# Patient Record
Sex: MALE | NOT HISPANIC OR LATINO | URBAN - METROPOLITAN AREA
[De-identification: names, ages, dates, MRNs, and addresses within clinical notes are randomized per-mention and may not be internally consistent; named-entity substitution may affect disease eponyms.]

---

## 2019-01-03 ENCOUNTER — OFFICE (OUTPATIENT)
Dept: URBAN - METROPOLITAN AREA CLINIC 33 | Facility: CLINIC | Age: 29
End: 2019-01-03
Payer: MEDICAID

## 2019-01-03 VITALS
HEIGHT: 64 IN | DIASTOLIC BLOOD PRESSURE: 62 MMHG | WEIGHT: 155 LBS | HEART RATE: 72 BPM | SYSTOLIC BLOOD PRESSURE: 128 MMHG | TEMPERATURE: 97.7 F

## 2019-01-03 DIAGNOSIS — R10.84 GENERALIZED ABDOMINAL PAIN: ICD-10-CM

## 2019-01-03 DIAGNOSIS — K50.90 CROHN'S DISEASE, UNSPECIFIED, WITHOUT COMPLICATIONS: ICD-10-CM

## 2019-01-03 DIAGNOSIS — R10.13 EPIGASTRIC PAIN: ICD-10-CM

## 2019-01-03 PROCEDURE — 99204 OFFICE O/P NEW MOD 45 MIN: CPT

## 2019-01-07 LAB
ADALIMUMAB+AB (SERIAL MONITOR): ADALIMUMAB DRUG LEVEL: 10 UG/ML
ADALIMUMAB+AB (SERIAL MONITOR): ANTI-ADALIMUMAB ANTIBODY: <25 NG/ML
AMBIG ABBREV BMP8 DEFAULT: (no result)
AMBIG ABBREV HFP7 DEFAULT: (no result)
BASIC METABOLIC PANEL (8): BUN/CREATININE RATIO: 10 (ref 9–20)
BASIC METABOLIC PANEL (8): BUN: 10 MG/DL (ref 6–20)
BASIC METABOLIC PANEL (8): CALCIUM: 9.5 MG/DL (ref 8.7–10.2)
BASIC METABOLIC PANEL (8): CARBON DIOXIDE, TOTAL: 22 MMOL/L (ref 20–29)
BASIC METABOLIC PANEL (8): CHLORIDE: 102 MMOL/L (ref 96–106)
BASIC METABOLIC PANEL (8): CREATININE: 1.03 MG/DL (ref 0.76–1.27)
BASIC METABOLIC PANEL (8): EGFR IF AFRICN AM: 114 ML/MIN/1.73 (ref 59–?)
BASIC METABOLIC PANEL (8): EGFR IF NONAFRICN AM: 98 ML/MIN/1.73 (ref 59–?)
BASIC METABOLIC PANEL (8): GLUCOSE: 96 MG/DL (ref 65–99)
BASIC METABOLIC PANEL (8): POTASSIUM: 4.1 MMOL/L (ref 3.5–5.2)
BASIC METABOLIC PANEL (8): SODIUM: 141 MMOL/L (ref 134–144)
CBC/DIFF AMBIGUOUS DEFAULT: BASO (ABSOLUTE): 0 X10E3/UL (ref 0–0.2)
CBC/DIFF AMBIGUOUS DEFAULT: BASOS: 0 %
CBC/DIFF AMBIGUOUS DEFAULT: EOS (ABSOLUTE): 0.1 X10E3/UL (ref 0–0.4)
CBC/DIFF AMBIGUOUS DEFAULT: EOS: 2 %
CBC/DIFF AMBIGUOUS DEFAULT: HEMATOCRIT: 45.5 % (ref 37.5–51)
CBC/DIFF AMBIGUOUS DEFAULT: HEMOGLOBIN: 15.6 G/DL (ref 13–17.7)
CBC/DIFF AMBIGUOUS DEFAULT: IMMATURE GRANS (ABS): 0 X10E3/UL (ref 0–0.1)
CBC/DIFF AMBIGUOUS DEFAULT: IMMATURE GRANULOCYTES: 0 %
CBC/DIFF AMBIGUOUS DEFAULT: LYMPHS (ABSOLUTE): 1.8 X10E3/UL (ref 0.7–3.1)
CBC/DIFF AMBIGUOUS DEFAULT: LYMPHS: 30 %
CBC/DIFF AMBIGUOUS DEFAULT: MCH: 30.6 PG (ref 26.6–33)
CBC/DIFF AMBIGUOUS DEFAULT: MCHC: 34.3 G/DL (ref 31.5–35.7)
CBC/DIFF AMBIGUOUS DEFAULT: MCV: 89 FL (ref 79–97)
CBC/DIFF AMBIGUOUS DEFAULT: MONOCYTES(ABSOLUTE): 0.5 X10E3/UL (ref 0.1–0.9)
CBC/DIFF AMBIGUOUS DEFAULT: MONOCYTES: 8 %
CBC/DIFF AMBIGUOUS DEFAULT: NEUTROPHILS (ABSOLUTE): 3.6 X10E3/UL (ref 1.4–7)
CBC/DIFF AMBIGUOUS DEFAULT: NEUTROPHILS: 60 %
CBC/DIFF AMBIGUOUS DEFAULT: PLATELETS: 346 X10E3/UL (ref 150–379)
CBC/DIFF AMBIGUOUS DEFAULT: RBC: 5.1 X10E6/UL (ref 4.14–5.8)
CBC/DIFF AMBIGUOUS DEFAULT: RDW: 13.7 % (ref 12.3–15.4)
CBC/DIFF AMBIGUOUS DEFAULT: WBC: 6.1 X10E3/UL (ref 3.4–10.8)
HEPATIC FUNCTION PANEL (7): ALBUMIN: 4.5 G/DL (ref 3.5–5.5)
HEPATIC FUNCTION PANEL (7): ALKALINE PHOSPHATASE: 37 IU/L — LOW (ref 39–117)
HEPATIC FUNCTION PANEL (7): ALT (SGPT): 26 IU/L (ref 0–44)
HEPATIC FUNCTION PANEL (7): AST (SGOT): 38 IU/L (ref 0–40)
HEPATIC FUNCTION PANEL (7): BILIRUBIN, DIRECT: 0.15 MG/DL (ref 0–0.4)
HEPATIC FUNCTION PANEL (7): BILIRUBIN, TOTAL: 0.6 MG/DL (ref 0–1.2)
HEPATIC FUNCTION PANEL (7): PROTEIN, TOTAL: 7.2 G/DL (ref 6–8.5)

## 2019-01-08 ENCOUNTER — AMBULATORY SURGICAL CENTER (OUTPATIENT)
Dept: URBAN - METROPOLITAN AREA SURGERY 21 | Facility: SURGERY | Age: 29
End: 2019-01-08
Payer: MEDICAID

## 2019-01-08 DIAGNOSIS — K29.60 OTHER GASTRITIS WITHOUT BLEEDING: ICD-10-CM

## 2019-01-08 DIAGNOSIS — R10.84 GENERALIZED ABDOMINAL PAIN: ICD-10-CM

## 2019-01-08 DIAGNOSIS — K50.90 CROHN'S DISEASE, UNSPECIFIED, WITHOUT COMPLICATIONS: ICD-10-CM

## 2019-01-08 PROCEDURE — 43239 EGD BIOPSY SINGLE/MULTIPLE: CPT

## 2019-01-15 ENCOUNTER — AMBULATORY SURGICAL CENTER (OUTPATIENT)
Dept: URBAN - METROPOLITAN AREA SURGERY 21 | Facility: SURGERY | Age: 29
End: 2019-01-15
Payer: MEDICAID

## 2019-01-15 DIAGNOSIS — K50.90 CROHN'S DISEASE, UNSPECIFIED, WITHOUT COMPLICATIONS: ICD-10-CM

## 2019-01-15 PROCEDURE — 45380 COLONOSCOPY AND BIOPSY: CPT

## 2019-03-14 ENCOUNTER — OFFICE (OUTPATIENT)
Dept: URBAN - METROPOLITAN AREA CLINIC 33 | Facility: CLINIC | Age: 29
End: 2019-03-14
Payer: MEDICAID

## 2019-03-14 VITALS
TEMPERATURE: 97.8 F | WEIGHT: 152 LBS | DIASTOLIC BLOOD PRESSURE: 70 MMHG | SYSTOLIC BLOOD PRESSURE: 103 MMHG | HEART RATE: 56 BPM | HEIGHT: 64 IN

## 2019-03-14 DIAGNOSIS — K50.80 CROHN'S DISEASE OF BOTH SMALL AND LARGE INTESTINE WITHOUT CO: ICD-10-CM

## 2019-03-14 PROCEDURE — 99214 OFFICE O/P EST MOD 30 MIN: CPT

## 2019-03-26 LAB
HBSAG SCREEN: NEGATIVE
HEP B CORE AB, IGM: NEGATIVE
HEP B SURFACE AB: HEP B SURFACE AB, QUAL: NON REACTIVE
QUANTIFERON-TB GOLD PLUS: NEGATIVE
QUANTIFERON-TB GOLD PLUS: QUANTIFERON CRITERIA: (no result)
QUANTIFERON-TB GOLD PLUS: QUANTIFERON INCUBATION: (no result)
QUANTIFERON-TB GOLD PLUS: QUANTIFERON MITOGEN VALUE: >10 IU/ML
QUANTIFERON-TB GOLD PLUS: QUANTIFERON NIL VALUE: 0.03 IU/ML
QUANTIFERON-TB GOLD PLUS: QUANTIFERON TB1 AG VALUE: 0.02 IU/ML
QUANTIFERON-TB GOLD PLUS: QUANTIFERON TB2 AG VALUE: 0.02 IU/ML

## 2019-08-12 ENCOUNTER — OFFICE (OUTPATIENT)
Dept: URBAN - METROPOLITAN AREA CLINIC 33 | Facility: CLINIC | Age: 29
End: 2019-08-12
Payer: COMMERCIAL

## 2019-08-12 VITALS
WEIGHT: 154 LBS | DIASTOLIC BLOOD PRESSURE: 72 MMHG | TEMPERATURE: 97 F | HEART RATE: 65 BPM | HEIGHT: 64 IN | SYSTOLIC BLOOD PRESSURE: 110 MMHG

## 2019-08-12 DIAGNOSIS — Z09 ENCOUNTER FOR FOLLOW-UP EXAMINATION AFTER COMPLETED TREATMEN: ICD-10-CM

## 2019-08-12 DIAGNOSIS — K50.90 CROHN'S DISEASE, UNSPECIFIED, WITHOUT COMPLICATIONS: ICD-10-CM

## 2019-08-12 LAB — CALPROTECTIN, FECAL: <16 UG/G

## 2019-08-12 PROCEDURE — 99214 OFFICE O/P EST MOD 30 MIN: CPT

## 2019-08-12 RX ORDER — ADALIMUMAB 40MG/0.8ML
KIT SUBCUTANEOUS
Qty: 1 | Refills: 11 | Status: COMPLETED
Start: 2019-08-12 | End: 2019-08-21

## 2019-08-12 RX ORDER — HYOSCYAMINE SULFATE 0.12 MG/1
TABLET, ORALLY DISINTEGRATING ORAL
Qty: 30 | Refills: 5 | Status: COMPLETED
Start: 2019-08-12 | End: 2020-04-02

## 2020-03-24 ENCOUNTER — OFFICE (OUTPATIENT)
Dept: URBAN - METROPOLITAN AREA CLINIC 102 | Facility: CLINIC | Age: 30
End: 2020-03-24

## 2020-03-24 PROCEDURE — 00001: CPT | Performed by: INTERNAL MEDICINE

## 2020-04-02 ENCOUNTER — OFFICE (OUTPATIENT)
Dept: URBAN - METROPOLITAN AREA CLINIC 102 | Facility: CLINIC | Age: 30
End: 2020-04-02

## 2020-04-02 VITALS — HEIGHT: 64 IN

## 2020-04-02 DIAGNOSIS — K50.90 CROHN'S DISEASE, UNSPECIFIED, WITHOUT COMPLICATIONS: ICD-10-CM

## 2020-04-02 PROCEDURE — 99213 OFFICE O/P EST LOW 20 MIN: CPT | Performed by: INTERNAL MEDICINE

## 2020-04-02 NOTE — SERVICEHPINOTES
PATIENT VERIFIED BY DATE OF BIRTH AND NAME. Patient has been consented for this telecommunication visit. Feels well.  NO issues.  Taking his humira regularly without issue at injection site.  Good oral intake without nausea or emesis.  Has not used levsin in a few months.No fever, chills, loss of weightBRno itchy eyes,BRno sneezing coughingBRno chest pain, palpitation, or shortness of breath.BRNo edemaBRNo unintional weight loss or blood in stoolBRno night sweatsBRno headaches, dizziness, or lightheadedness Time of visit was approx. 30 minutes.

## 2020-10-16 ENCOUNTER — OFFICE (OUTPATIENT)
Dept: URBAN - METROPOLITAN AREA TELEHEALTH 12 | Facility: TELEHEALTH | Age: 30
End: 2020-10-16

## 2020-10-16 VITALS — WEIGHT: 154 LBS | HEIGHT: 64 IN

## 2020-10-16 DIAGNOSIS — K50.90 CROHN'S DISEASE, UNSPECIFIED, WITHOUT COMPLICATIONS: ICD-10-CM

## 2020-10-16 PROCEDURE — 99213 OFFICE O/P EST LOW 20 MIN: CPT | Mod: GQ | Performed by: INTERNAL MEDICINE

## 2020-10-16 NOTE — SERVICENOTES
Visit was 15 minutes, Patient's visit was conducted through video telecommunication. Patient consented before the start of visit as to understanding of privacy concerns, possible technological failure, and their responsibility of carrying out instructions of plan.

## 2020-10-16 NOTE — SERVICEHPINOTES
PATIENT VERIFIED BY DATE OF BIRTH AND NAME. Patient has been consented for this telecommunication visit.  Doing well on humira without any issues. No abdominal pain, nausae, emesis, injection site issues, joint pain, rashes or jaundice.Normal appetite or weight loss. Reports being treated for H pylori in the past when he had his ulcer diagnosed, no symptoms since that time.ROS as stated above otherwise was negative.

## 2021-04-21 ENCOUNTER — OFFICE (OUTPATIENT)
Dept: URBAN - METROPOLITAN AREA TELEHEALTH 3 | Facility: TELEHEALTH | Age: 31
End: 2021-04-21

## 2021-04-21 VITALS — WEIGHT: 160 LBS | HEIGHT: 64 IN

## 2021-04-21 DIAGNOSIS — K50.90 CROHN'S DISEASE, UNSPECIFIED, WITHOUT COMPLICATIONS: ICD-10-CM

## 2021-04-21 PROCEDURE — 99213 OFFICE O/P EST LOW 20 MIN: CPT | Mod: 95 | Performed by: PHYSICIAN ASSISTANT

## 2021-04-21 NOTE — SERVICEHPINOTES
PATIENT VERIFIED BY DATE OF BIRTH AND NAME. Patient has been consented for this telecommunication visit.   Mr. Dillard is here for f/u regarding Crohn's Disease. Last colonoscopy 01/2019 was unremarkable given a 5 yr recall.    Doing well on humira without any issues and he has been on it for a few yrs now. No abdominal pain, nausea, emesis, injection site issues, or rashes. Very occasional joint pain (usually during winter only) and no eye pain. Labs unremarkable. He recently received COVID vaccine (1st dose) and will get 2nd dose soon. No issues with this. He hasn't had a quantiferon gold test since 2020. ROS as per HPI and otherwise is negative

## 2021-12-06 ENCOUNTER — OFFICE (OUTPATIENT)
Dept: URBAN - METROPOLITAN AREA TELEHEALTH 3 | Facility: TELEHEALTH | Age: 31
End: 2021-12-06

## 2021-12-06 VITALS — HEIGHT: 64 IN | WEIGHT: 165 LBS

## 2021-12-06 DIAGNOSIS — K50.90 CROHN'S DISEASE, UNSPECIFIED, WITHOUT COMPLICATIONS: ICD-10-CM

## 2021-12-06 PROCEDURE — 99213 OFFICE O/P EST LOW 20 MIN: CPT | Mod: 95 | Performed by: INTERNAL MEDICINE

## 2021-12-06 NOTE — SERVICEHPINOTES
PATIENT VERIFIED BY DATE OF BIRTH AND NAME. Patient has been consented for this telecommunication visit.   Having normal brown bowel movements. No abdominal pain, diarrhea, or blood in stool.

## 2021-12-06 NOTE — SERVICENOTES
., Patient's visit was conducted through video telecommunication. Patient consented before the start of visit as to understanding of privacy concerns, possible technological failure, and their responsibility of carrying out instructions of plan.

## 2022-01-13 ENCOUNTER — OFFICE (OUTPATIENT)
Dept: URBAN - METROPOLITAN AREA CLINIC 34 | Facility: CLINIC | Age: 32
End: 2022-01-13
Payer: COMMERCIAL

## 2022-01-13 DIAGNOSIS — Z11.59 ENCOUNTER FOR SCREENING FOR OTHER VIRAL DISEASES: ICD-10-CM

## 2022-01-13 PROCEDURE — G2023 SPECIMEN COLLECT COVID-19: HCPCS | Performed by: INTERNAL MEDICINE

## 2022-02-09 ENCOUNTER — APPOINTMENT (RX ONLY)
Dept: URBAN - METROPOLITAN AREA CLINIC 43 | Facility: CLINIC | Age: 32
Setting detail: DERMATOLOGY
End: 2022-02-09

## 2022-02-09 DIAGNOSIS — L29.89 OTHER PRURITUS: ICD-10-CM

## 2022-02-09 DIAGNOSIS — L85.3 XEROSIS CUTIS: ICD-10-CM

## 2022-02-09 DIAGNOSIS — L05.91 PILONIDAL CYST WITHOUT ABSCESS: ICD-10-CM

## 2022-02-09 DIAGNOSIS — L29.8 OTHER PRURITUS: ICD-10-CM

## 2022-02-09 DIAGNOSIS — L20.89 OTHER ATOPIC DERMATITIS: ICD-10-CM

## 2022-02-09 PROBLEM — L20.84 INTRINSIC (ALLERGIC) ECZEMA: Status: ACTIVE | Noted: 2022-02-09

## 2022-02-09 PROCEDURE — ? ADDITIONAL NOTES

## 2022-02-09 PROCEDURE — 99203 OFFICE O/P NEW LOW 30 MIN: CPT

## 2022-02-09 PROCEDURE — ? PRESCRIPTION

## 2022-02-09 PROCEDURE — ? COUNSELING

## 2022-02-09 RX ORDER — CAMPHOR AND MENTHOL 5; 5 MG/ML; MG/ML
LOTION TOPICAL
Qty: 222 | Refills: 3 | Status: ERX | COMMUNITY
Start: 2022-02-09

## 2022-02-09 RX ORDER — TRIAMCINOLONE ACETONIDE 1 MG/G
OINTMENT TOPICAL
Qty: 80 | Refills: 1 | Status: ERX | COMMUNITY
Start: 2022-02-09

## 2022-02-09 RX ADMIN — TRIAMCINOLONE ACETONIDE: 1 OINTMENT TOPICAL at 00:00

## 2022-02-09 RX ADMIN — CAMPHOR AND MENTHOL: 5; 5 LOTION TOPICAL at 00:00

## 2022-02-09 ASSESSMENT — LOCATION SIMPLE DESCRIPTION DERM
LOCATION SIMPLE: RIGHT THIGH
LOCATION SIMPLE: LEFT THIGH
LOCATION SIMPLE: RIGHT UPPER BACK
LOCATION SIMPLE: LEFT BUTTOCK
LOCATION SIMPLE: RIGHT EAR

## 2022-02-09 ASSESSMENT — LOCATION DETAILED DESCRIPTION DERM
LOCATION DETAILED: RIGHT POSTERIOR EAR
LOCATION DETAILED: RIGHT ANTERIOR DISTAL THIGH
LOCATION DETAILED: RIGHT INFERIOR MEDIAL UPPER BACK
LOCATION DETAILED: LEFT MEDIAL BUTTOCK
LOCATION DETAILED: LEFT ANTERIOR DISTAL THIGH

## 2022-02-09 ASSESSMENT — LOCATION ZONE DERM
LOCATION ZONE: LEG
LOCATION ZONE: TRUNK
LOCATION ZONE: EAR

## 2022-02-09 NOTE — HPI: CYST
How Severe Is Your Cyst?: mild
Is This A New Presentation, Or A Follow-Up?: Cyst
Additional History: Had cyst removed right next to new cyst

## 2022-02-09 NOTE — PROCEDURE: ADDITIONAL NOTES
Additional Notes: Pt had different pilonidal cyst removed previously\\nDiscussed cyst may be deep/close to muscle\\nDiscussed ILK injection, pt declines\\nRefer to Dr. Braga, pt given info
Render Risk Assessment In Note?: no
Detail Level: Simple

## 2023-01-19 ENCOUNTER — TELEHEALTH PROVIDED IN PATIENT'S HOME (OUTPATIENT)
Dept: URBAN - METROPOLITAN AREA TELEHEALTH 3 | Facility: TELEHEALTH | Age: 33
End: 2023-01-19

## 2023-01-19 VITALS — HEIGHT: 64 IN | WEIGHT: 145 LBS

## 2023-01-19 DIAGNOSIS — K50.90 CROHN'S DISEASE, UNSPECIFIED, WITHOUT COMPLICATIONS: ICD-10-CM

## 2023-01-19 PROCEDURE — 99214 OFFICE O/P EST MOD 30 MIN: CPT | Mod: 95 | Performed by: INTERNAL MEDICINE

## 2023-01-31 LAB
AMBIG ABBREV CMP14 DEFAULT: (no result)
CBC WITH DIFFERENTIAL/PLATELET: BASO (ABSOLUTE): 0.1 X10E3/UL (ref 0–0.2)
CBC WITH DIFFERENTIAL/PLATELET: BASOS: 1 %
CBC WITH DIFFERENTIAL/PLATELET: EOS (ABSOLUTE): 0.5 X10E3/UL — HIGH (ref 0–0.4)
CBC WITH DIFFERENTIAL/PLATELET: EOS: 7 %
CBC WITH DIFFERENTIAL/PLATELET: HEMATOCRIT: 47.6 % (ref 37.5–51)
CBC WITH DIFFERENTIAL/PLATELET: HEMATOLOGY COMMENTS: (no result)
CBC WITH DIFFERENTIAL/PLATELET: HEMOGLOBIN: 16.4 G/DL (ref 13–17.7)
CBC WITH DIFFERENTIAL/PLATELET: IMMATURE CELLS: (no result)
CBC WITH DIFFERENTIAL/PLATELET: IMMATURE GRANS (ABS): 0 X10E3/UL (ref 0–0.1)
CBC WITH DIFFERENTIAL/PLATELET: IMMATURE GRANULOCYTES: 0 %
CBC WITH DIFFERENTIAL/PLATELET: LYMPHS (ABSOLUTE): 1.8 X10E3/UL (ref 0.7–3.1)
CBC WITH DIFFERENTIAL/PLATELET: LYMPHS: 25 %
CBC WITH DIFFERENTIAL/PLATELET: MCH: 30.4 PG (ref 26.6–33)
CBC WITH DIFFERENTIAL/PLATELET: MCHC: 34.5 G/DL (ref 31.5–35.7)
CBC WITH DIFFERENTIAL/PLATELET: MCV: 88 FL (ref 79–97)
CBC WITH DIFFERENTIAL/PLATELET: MONOCYTES(ABSOLUTE): 0.5 X10E3/UL (ref 0.1–0.9)
CBC WITH DIFFERENTIAL/PLATELET: MONOCYTES: 7 %
CBC WITH DIFFERENTIAL/PLATELET: NEUTROPHILS (ABSOLUTE): 4.2 X10E3/UL (ref 1.4–7)
CBC WITH DIFFERENTIAL/PLATELET: NEUTROPHILS: 60 %
CBC WITH DIFFERENTIAL/PLATELET: NRBC: (no result)
CBC WITH DIFFERENTIAL/PLATELET: PLATELETS: 429 X10E3/UL (ref 150–450)
CBC WITH DIFFERENTIAL/PLATELET: RBC: 5.4 X10E6/UL (ref 4.14–5.8)
CBC WITH DIFFERENTIAL/PLATELET: RDW: 12.7 % (ref 11.6–15.4)
CBC WITH DIFFERENTIAL/PLATELET: WBC: 6.9 X10E3/UL (ref 3.4–10.8)
COMP. METABOLIC PANEL (14): A/G RATIO: 1.5 (ref 1.2–2.2)
COMP. METABOLIC PANEL (14): ALBUMIN: 4.4 G/DL (ref 4–5)
COMP. METABOLIC PANEL (14): ALKALINE PHOSPHATASE: 61 IU/L (ref 44–121)
COMP. METABOLIC PANEL (14): ALT (SGPT): 17 IU/L (ref 0–44)
COMP. METABOLIC PANEL (14): AST (SGOT): 30 IU/L (ref 0–40)
COMP. METABOLIC PANEL (14): BILIRUBIN, TOTAL: 0.3 MG/DL (ref 0–1.2)
COMP. METABOLIC PANEL (14): BUN/CREATININE RATIO: 9 (ref 9–20)
COMP. METABOLIC PANEL (14): BUN: 9 MG/DL (ref 6–20)
COMP. METABOLIC PANEL (14): CALCIUM: 9.5 MG/DL (ref 8.7–10.2)
COMP. METABOLIC PANEL (14): CARBON DIOXIDE, TOTAL: 26 MMOL/L (ref 20–29)
COMP. METABOLIC PANEL (14): CHLORIDE: 102 MMOL/L (ref 96–106)
COMP. METABOLIC PANEL (14): CREATININE: 1.02 MG/DL (ref 0.76–1.27)
COMP. METABOLIC PANEL (14): EGFR: 100 ML/MIN/1.73 (ref 59–?)
COMP. METABOLIC PANEL (14): GLOBULIN, TOTAL: 3 G/DL (ref 1.5–4.5)
COMP. METABOLIC PANEL (14): GLUCOSE: 89 MG/DL (ref 70–99)
COMP. METABOLIC PANEL (14): POTASSIUM: 4.1 MMOL/L (ref 3.5–5.2)
COMP. METABOLIC PANEL (14): PROTEIN, TOTAL: 7.4 G/DL (ref 6–8.5)
COMP. METABOLIC PANEL (14): SODIUM: 139 MMOL/L (ref 134–144)
HEP A AB, TOTAL: NEGATIVE
HEP B SURFACE AB: HEP B SURFACE AB, QUAL: NON REACTIVE

## 2023-08-22 ENCOUNTER — TELEHEALTH PROVIDED OTHER THAN IN PATIENT'S HOME (OUTPATIENT)
Dept: URBAN - METROPOLITAN AREA TELEHEALTH 3 | Facility: TELEHEALTH | Age: 33
End: 2023-08-22
Payer: COMMERCIAL

## 2023-08-22 VITALS — WEIGHT: 150 LBS | HEIGHT: 65 IN

## 2023-08-22 DIAGNOSIS — K92.2 GASTROINTESTINAL HEMORRHAGE, UNSPECIFIED: ICD-10-CM

## 2023-08-22 DIAGNOSIS — R19.7 DIARRHEA, UNSPECIFIED: ICD-10-CM

## 2023-08-22 DIAGNOSIS — K50.90 CROHN'S DISEASE, UNSPECIFIED, WITHOUT COMPLICATIONS: ICD-10-CM

## 2023-08-22 PROCEDURE — 99214 OFFICE O/P EST MOD 30 MIN: CPT | Mod: 95 | Performed by: INTERNAL MEDICINE

## 2023-08-22 NOTE — SERVICENOTES
Patient's visit was conducted through [employer] video telecommunication. Patient consented before the start of visit as to understanding of privacy concerns, possible technological failure , and their responsibility of carrying out instructions of plan., I personally discussed the procedure with the patient, including the risks, benefits, and alternatives of colonoscopy. The procedure will be done under pharmacological sedation. Risks and potential complications of the procedure include, but not limited to: bleeding, infection, bowel perforation, adverse reaction to anesthetics and missed lesions. Biopsy, dilation, polypectomy and/or maneuvers to control bleeding may be performed. Other alternative diagnostic or therapeutic procedures, such as medication treatment, x-ray, and surgery may be available. Another option is to choose no diagnostic exam and/or treatment. Discussed the need for low fiber diet for a week before procedure and clear liquid diet the day before procedure. Discussed prep with Dulcolax or Miralax with Gatorade. Patient expressed understanding of all these risks and was given the opportunity to ask questions, which I answered to the patient’s satisfaction. The patient agreed to the procedures.

## 2023-08-22 NOTE — SERVICEHPINOTES
PATIENT VERIFIED BY DATE OF BIRTH AND NAME. Patient has been consented for this telecommunication visit.  Notes somewhat looser stool with small amounts of blood.  NO joint pain.  Chakraborty been taking Humira regularly.  No weight loss.  Normal appetitie.  No nausea, emesis. Will finish his hepatitis immunizations this month.

## 2023-09-07 ENCOUNTER — OFFICE (OUTPATIENT)
Dept: URBAN - METROPOLITAN AREA CLINIC 30 | Facility: CLINIC | Age: 33
End: 2023-09-07
Payer: COMMERCIAL

## 2023-09-07 ENCOUNTER — OFFICE (OUTPATIENT)
Dept: URBAN - METROPOLITAN AREA PATHOLOGY 18 | Facility: PATHOLOGY | Age: 33
End: 2023-09-07
Payer: COMMERCIAL

## 2023-09-07 VITALS
RESPIRATION RATE: 18 BRPM | HEART RATE: 90 BPM | SYSTOLIC BLOOD PRESSURE: 94 MMHG | DIASTOLIC BLOOD PRESSURE: 60 MMHG | RESPIRATION RATE: 33 BRPM | OXYGEN SATURATION: 94 % | TEMPERATURE: 98 F | DIASTOLIC BLOOD PRESSURE: 63 MMHG | HEART RATE: 81 BPM | HEART RATE: 69 BPM | RESPIRATION RATE: 16 BRPM | OXYGEN SATURATION: 99 % | OXYGEN SATURATION: 93 % | SYSTOLIC BLOOD PRESSURE: 103 MMHG | HEIGHT: 65 IN | OXYGEN SATURATION: 96 % | HEART RATE: 86 BPM | HEART RATE: 82 BPM | RESPIRATION RATE: 27 BRPM | DIASTOLIC BLOOD PRESSURE: 66 MMHG | SYSTOLIC BLOOD PRESSURE: 106 MMHG | SYSTOLIC BLOOD PRESSURE: 111 MMHG | DIASTOLIC BLOOD PRESSURE: 50 MMHG | WEIGHT: 150 LBS | RESPIRATION RATE: 25 BRPM | HEART RATE: 88 BPM | TEMPERATURE: 98.4 F | SYSTOLIC BLOOD PRESSURE: 107 MMHG | DIASTOLIC BLOOD PRESSURE: 87 MMHG

## 2023-09-07 DIAGNOSIS — K52.9 NONINFECTIVE GASTROENTERITIS AND COLITIS, UNSPECIFIED: ICD-10-CM

## 2023-09-07 PROCEDURE — 88305 TISSUE EXAM BY PATHOLOGIST: CPT | Performed by: PATHOLOGY

## 2024-01-16 ENCOUNTER — TELEHEALTH PROVIDED OTHER THAN IN PATIENT'S HOME (OUTPATIENT)
Dept: URBAN - METROPOLITAN AREA TELEHEALTH 7 | Facility: TELEHEALTH | Age: 34
End: 2024-01-16

## 2024-01-16 VITALS — HEIGHT: 65 IN | WEIGHT: 145 LBS

## 2024-01-16 DIAGNOSIS — K50.013 CROHN'S DISEASE OF SMALL INTESTINE WITH FISTULA: ICD-10-CM

## 2024-01-16 DIAGNOSIS — C81.90 HODGKIN LYMPHOMA, UNSPECIFIED, UNSPECIFIED SITE: ICD-10-CM

## 2024-01-16 PROCEDURE — 99213 OFFICE O/P EST LOW 20 MIN: CPT | Mod: 95 | Performed by: INTERNAL MEDICINE

## 2024-01-16 NOTE — SERVICEHPINOTES
PATIENT VERIFIED BY DATE OF BIRTH AND NAME. Patient has been consented for this telecommunication visit.  Gets nausea after the chemo and tired afterward. PET scan in march when treatment will be ending.  NO issues with bowels.  Normal brown bowel movements without any blood.  Normal appetitie except when receiving chemo.  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  Overall he says he has been doing well.

## 2024-01-16 NOTE — SERVICENOTES
Patient's visit was conducted through [employer] video telecommunication. Patient consented before the start of visit as to understanding of privacy concerns, possible technological failure, and their responsibility of carrying out instructions of plan.

## 2025-02-12 ENCOUNTER — TELEHEALTH PROVIDED IN PATIENT'S HOME (OUTPATIENT)
Dept: URBAN - METROPOLITAN AREA TELEHEALTH 12 | Facility: TELEHEALTH | Age: 35
End: 2025-02-12

## 2025-02-12 VITALS — HEIGHT: 65 IN | WEIGHT: 165 LBS

## 2025-02-12 DIAGNOSIS — Z85.71 PERSONAL HISTORY OF HODGKIN LYMPHOMA: ICD-10-CM

## 2025-02-12 DIAGNOSIS — K50.90 CROHN'S DISEASE, UNSPECIFIED, WITHOUT COMPLICATIONS: ICD-10-CM

## 2025-02-12 DIAGNOSIS — R19.7 DIARRHEA, UNSPECIFIED: ICD-10-CM

## 2025-02-12 PROCEDURE — 99214 OFFICE O/P EST MOD 30 MIN: CPT | Mod: 95 | Performed by: INTERNAL MEDICINE

## 2025-02-12 NOTE — SERVICENOTES
Patient was located in their home during visit., Patient's visit was conducted through [employer] video telecommunication. Patient consented before the start of visit as to understanding of privacy concerns, possible technological failure, and their responsibility of carrying out instructions of plan.

## 2025-02-12 NOTE — SERVICEHPINOTES
PATIENT VERIFIED BY DATE OF BIRTH AND NAME. Patient has been consented for this telecommunication visit using [employer] application.  Was diagnosed with Hodgkin's Lymphoma and finished treatment last year.  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  He has an upcoming PET scan this spring to evaluate - but says his las scan have been "clean."  .  Says that his diarrhea started and worsened over the last 1 year, Eventually he went to see Dr Paz, GI in [place] , and had a colonoscopy on Jan 7 where patient states there was inflammation noted- ulceration and friablility in the IC valve and internal hemorhoids.  Was started on budesonide, which is he has been taking mostly every day, and was recc to have an MRE and was to start Skyrizi.  He has not done either of this yetbr
br Says he is having 5-6 bm's per day, intermittent blood, small amount.  Normal appetitie.  No weight loss. . 
br

## 2025-02-17 LAB
AMBIG ABBREV CMP14 DEFAULT: (no result)
C DIFFICILE TOXIN GENE NAA: NEGATIVE
CBC WITH DIFFERENTIAL/PLATELET: BASO (ABSOLUTE): 0 X10E3/UL (ref 0–0.2)
CBC WITH DIFFERENTIAL/PLATELET: BASOS: 1 %
CBC WITH DIFFERENTIAL/PLATELET: EOS (ABSOLUTE): 0.1 X10E3/UL (ref 0–0.4)
CBC WITH DIFFERENTIAL/PLATELET: EOS: 2 %
CBC WITH DIFFERENTIAL/PLATELET: HEMATOCRIT: 45.2 % (ref 37.5–51)
CBC WITH DIFFERENTIAL/PLATELET: HEMATOLOGY COMMENTS: (no result)
CBC WITH DIFFERENTIAL/PLATELET: HEMOGLOBIN: 15.6 G/DL (ref 13–17.7)
CBC WITH DIFFERENTIAL/PLATELET: IMMATURE CELLS: (no result)
CBC WITH DIFFERENTIAL/PLATELET: IMMATURE GRANS (ABS): 0 X10E3/UL (ref 0–0.1)
CBC WITH DIFFERENTIAL/PLATELET: IMMATURE GRANULOCYTES: 0 %
CBC WITH DIFFERENTIAL/PLATELET: LYMPHS (ABSOLUTE): 1.5 X10E3/UL (ref 0.7–3.1)
CBC WITH DIFFERENTIAL/PLATELET: LYMPHS: 24 %
CBC WITH DIFFERENTIAL/PLATELET: MCH: 30.7 PG (ref 26.6–33)
CBC WITH DIFFERENTIAL/PLATELET: MCHC: 34.5 G/DL (ref 31.5–35.7)
CBC WITH DIFFERENTIAL/PLATELET: MCV: 89 FL (ref 79–97)
CBC WITH DIFFERENTIAL/PLATELET: MONOCYTES(ABSOLUTE): 0.5 X10E3/UL (ref 0.1–0.9)
CBC WITH DIFFERENTIAL/PLATELET: MONOCYTES: 9 %
CBC WITH DIFFERENTIAL/PLATELET: NEUTROPHILS (ABSOLUTE): 3.9 X10E3/UL (ref 1.4–7)
CBC WITH DIFFERENTIAL/PLATELET: NEUTROPHILS: 64 %
CBC WITH DIFFERENTIAL/PLATELET: NRBC: (no result)
CBC WITH DIFFERENTIAL/PLATELET: PLATELETS: 332 X10E3/UL (ref 150–450)
CBC WITH DIFFERENTIAL/PLATELET: RBC: 5.08 X10E6/UL (ref 4.14–5.8)
CBC WITH DIFFERENTIAL/PLATELET: RDW: 12.5 % (ref 11.6–15.4)
CBC WITH DIFFERENTIAL/PLATELET: WBC: 6.1 X10E3/UL (ref 3.4–10.8)
COMP. METABOLIC PANEL (14): ALBUMIN: 4.4 G/DL (ref 4.1–5.1)
COMP. METABOLIC PANEL (14): ALKALINE PHOSPHATASE: 59 IU/L (ref 44–121)
COMP. METABOLIC PANEL (14): ALT (SGPT): 16 IU/L (ref 0–44)
COMP. METABOLIC PANEL (14): AST (SGOT): 27 IU/L (ref 0–40)
COMP. METABOLIC PANEL (14): BILIRUBIN, TOTAL: 0.4 MG/DL (ref 0–1.2)
COMP. METABOLIC PANEL (14): BUN/CREATININE RATIO: 11 (ref 9–20)
COMP. METABOLIC PANEL (14): BUN: 11 MG/DL (ref 6–20)
COMP. METABOLIC PANEL (14): CALCIUM: 9.4 MG/DL (ref 8.7–10.2)
COMP. METABOLIC PANEL (14): CARBON DIOXIDE, TOTAL: 22 MMOL/L (ref 20–29)
COMP. METABOLIC PANEL (14): CHLORIDE: 101 MMOL/L (ref 96–106)
COMP. METABOLIC PANEL (14): CREATININE: 0.97 MG/DL (ref 0.76–1.27)
COMP. METABOLIC PANEL (14): EGFR: 105 ML/MIN/1.73 (ref 59–?)
COMP. METABOLIC PANEL (14): GLOBULIN, TOTAL: 2.8 G/DL (ref 1.5–4.5)
COMP. METABOLIC PANEL (14): GLUCOSE: 76 MG/DL (ref 70–99)
COMP. METABOLIC PANEL (14): POTASSIUM: 4.6 MMOL/L (ref 3.5–5.2)
COMP. METABOLIC PANEL (14): PROTEIN, TOTAL: 7.2 G/DL (ref 6–8.5)
COMP. METABOLIC PANEL (14): SODIUM: 140 MMOL/L (ref 134–144)
QUANTIFERON-TB GOLD PLUS: NEGATIVE
QUANTIFERON-TB GOLD PLUS: QUANTIFERON CRITERIA: (no result)
QUANTIFERON-TB GOLD PLUS: QUANTIFERON INCUBATION: (no result)
QUANTIFERON-TB GOLD PLUS: QUANTIFERON MITOGEN VALUE: >10 IU/ML
QUANTIFERON-TB GOLD PLUS: QUANTIFERON NIL VALUE: 0.03 IU/ML
QUANTIFERON-TB GOLD PLUS: QUANTIFERON TB1 AG VALUE: 0.04 IU/ML
QUANTIFERON-TB GOLD PLUS: QUANTIFERON TB2 AG VALUE: 0.04 IU/ML

## 2025-04-08 ENCOUNTER — OFFICE (OUTPATIENT)
Dept: URBAN - METROPOLITAN AREA CLINIC 79 | Facility: CLINIC | Age: 35
End: 2025-04-08
Payer: COMMERCIAL

## 2025-04-08 DIAGNOSIS — K50.90 CROHN'S DISEASE, UNSPECIFIED, WITHOUT COMPLICATIONS: ICD-10-CM

## 2025-04-08 PROCEDURE — 96413 CHEMO IV INFUSION 1 HR: CPT | Performed by: INTERNAL MEDICINE

## 2025-05-06 ENCOUNTER — OFFICE (OUTPATIENT)
Dept: URBAN - METROPOLITAN AREA CLINIC 79 | Facility: CLINIC | Age: 35
End: 2025-05-06
Payer: COMMERCIAL

## 2025-05-06 DIAGNOSIS — K50.90 CROHN'S DISEASE, UNSPECIFIED, WITHOUT COMPLICATIONS: ICD-10-CM

## 2025-05-06 PROCEDURE — 96413 CHEMO IV INFUSION 1 HR: CPT | Performed by: INTERNAL MEDICINE

## 2025-06-03 ENCOUNTER — OFFICE (OUTPATIENT)
Dept: URBAN - METROPOLITAN AREA CLINIC 79 | Facility: CLINIC | Age: 35
End: 2025-06-03
Payer: COMMERCIAL

## 2025-06-03 DIAGNOSIS — K50.90 CROHN'S DISEASE, UNSPECIFIED, WITHOUT COMPLICATIONS: ICD-10-CM

## 2025-06-03 PROCEDURE — 96413 CHEMO IV INFUSION 1 HR: CPT | Performed by: INTERNAL MEDICINE

## 2025-07-03 ENCOUNTER — TELEHEALTH PROVIDED IN PATIENT'S HOME (OUTPATIENT)
Dept: URBAN - METROPOLITAN AREA TELEHEALTH 3 | Facility: TELEHEALTH | Age: 35
End: 2025-07-03
Payer: COMMERCIAL

## 2025-07-03 VITALS — HEIGHT: 65 IN | WEIGHT: 170 LBS

## 2025-07-03 DIAGNOSIS — K50.90 CROHN'S DISEASE, UNSPECIFIED, WITHOUT COMPLICATIONS: ICD-10-CM

## 2025-07-03 DIAGNOSIS — Z85.71 PERSONAL HISTORY OF HODGKIN LYMPHOMA: ICD-10-CM

## 2025-07-03 PROCEDURE — 99214 OFFICE O/P EST MOD 30 MIN: CPT | Mod: 95 | Performed by: INTERNAL MEDICINE

## 2025-07-03 NOTE — SERVICEHPINOTES
PATIENT VERIFIED BY DATE OF BIRTH AND NAME. Patient has been consented for this telecommunication visit using [employer] application.  Formed stool 1-2 times a day.  No blkood in stool  No abd pain, eye pain or joint pain Normal appetite.  Feels good. .

## 2025-07-03 NOTE — SERVICENOTES
Patient was located at their home during visit., I spent 30 minutes today reviewing the chart, talking with the patient, reviewing previous results with patient, discussing plan, and documenting. Patient understands and agrees with plan. Questions/concerns addressed., Patient's visit was conducted through [employer] video telecommunication. Patient consented before the start of visit as to understanding of privacy concerns, possible technological failure, and their responsibility of carrying out instructions of plan.